# Patient Record
Sex: MALE | URBAN - METROPOLITAN AREA
[De-identification: names, ages, dates, MRNs, and addresses within clinical notes are randomized per-mention and may not be internally consistent; named-entity substitution may affect disease eponyms.]

---

## 2020-01-30 ENCOUNTER — APPOINTMENT (RX ONLY)
Dept: URBAN - METROPOLITAN AREA CLINIC 74 | Facility: CLINIC | Age: 40
Setting detail: DERMATOLOGY
End: 2020-01-30

## 2020-01-30 DIAGNOSIS — T07XXXA INSECT BITE, NONVENOMOUS, OF OTHER, MULTIPLE, AND UNSPECIFIED SITES, WITHOUT MENTION OF INFECTION: ICD-10-CM

## 2020-01-30 PROBLEM — L30.9 DERMATITIS, UNSPECIFIED: Status: ACTIVE | Noted: 2020-01-30

## 2020-01-30 PROCEDURE — ? TREATMENT REGIMEN

## 2020-01-30 PROCEDURE — ? COUNSELING

## 2020-01-30 PROCEDURE — 99202 OFFICE O/P NEW SF 15 MIN: CPT

## 2020-01-30 PROCEDURE — ? PRESCRIPTION

## 2020-01-30 RX ORDER — FLUOCINONIDE 0.5 MG/G
OINTMENT TOPICAL
Qty: 1 | Refills: 0 | Status: ERX | COMMUNITY
Start: 2020-01-30

## 2020-01-30 RX ADMIN — FLUOCINONIDE: 0.5 OINTMENT TOPICAL at 00:00

## 2020-01-30 ASSESSMENT — LOCATION SIMPLE DESCRIPTION DERM: LOCATION SIMPLE: POSTERIOR NECK

## 2020-01-30 ASSESSMENT — LOCATION ZONE DERM: LOCATION ZONE: NECK

## 2020-01-30 ASSESSMENT — LOCATION DETAILED DESCRIPTION DERM: LOCATION DETAILED: MID POSTERIOR NECK

## 2020-01-30 NOTE — HPI: RASH
What Type Of Note Output Would You Prefer (Optional)?: Standard Output
How Severe Is Your Rash?: moderate
Is This A New Presentation, Or A Follow-Up?: Rash
Additional History: Patient is here for red and bumpy rash across back of neck that pt reports appeared about 2 days ago. Patient reports bumps are not itchy. Patient reports unsure of trigger. Patient denies any recent outdoor exposures. Patient has pets but does not believe that they have flees. Patient stated that he does have history of hives treated with phototherapy at [facility] ~4 years ago 2-3x/week for 6-9 months. Patient reports those hives were primarily on abdomen and were extremely itchy. Reports hives resolved. Patient not sure if current rash is hives or not. Patient reports has tried Benadryl and otc antifungal cream but not helping.
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
## 2020-01-30 NOTE — PROCEDURE: TREATMENT REGIMEN
Initiate Treatment: Fluocinonide ointment Apply to affected areas on posterior neck bid x7-10 days
Samples Given: Bryhali lotion x1 sample
Detail Level: Zone